# Patient Record
(demographics unavailable — no encounter records)

---

## 2024-11-26 NOTE — HISTORY OF PRESENT ILLNESS
[de-identified] : Rachel presents for her first injection of Xolair for food allergies. Rachel has a known allergy to peanut which has affected her well-being and quality of life. She currently attends the [facility]. Presently, she is feeling well other than a sore throat.

## 2024-11-26 NOTE — PHYSICAL EXAM
[Alert] : alert [Healthy Appearance] : healthy appearance [No Acute Distress] : no acute distress [Normal Pupil & Iris Size/Symmetry] : normal pupil and iris size and symmetry [No Discharge] : no discharge [Sclera Not Icteric] : sclera not icteric [Normal TMs] : both tympanic membranes were normal [Normal Nasal Mucosa] : the nasal mucosa was normal [Normal Lips/Tongue] : the lips and tongue were normal [Normal Outer Ear/Nose] : the ears and nose were normal in appearance [No Nasal Discharge] : no nasal discharge [No Thrush] : no thrush [Normal Rate and Effort] : normal respiratory rhythm and effort [No Crackles] : no crackles [No Retractions] : no retractions [Bilateral Audible Breath Sounds] : bilateral audible breath sounds [Normal Rate] : heart rate was normal  [Normal S1, S2] : normal S1 and S2 [No murmur] : no murmur [Regular Rhythm] : with a regular rhythm [Skin Intact] : skin intact  [No Rash] : no rash [No Skin Lesions] : no skin lesions [No Cyanosis] : no cyanosis [Normal Mood] : mood was normal [Normal Affect] : affect was normal [Judgment and Insight Age Appropriate] : judgement and insight is age appropriate [No LAD] : no lymphadenopathy [Supple] : the neck was supple

## 2024-11-26 NOTE — ASSESSMENT
[FreeTextEntry1] : Peanut allergy Continue strict avoidance Auvi-Q is up to date Discussed Neffy and showed Rachel and mom how to use. They would like a script sent to pharmacy to see if it is covered. Auvi-Q will be expiring soon. Xolair 300mg administered subcutaneously in office. 30 min wait No adverse events Continue Xolair 300mg Q4 weeks- Rachel will be learning how to self-administer  Pharyngitis-mild [place] gargles, lozenges, honey

## 2024-12-27 NOTE — ASSESSMENT
[FreeTextEntry1] : Peanut allergy Neffy 2.0 is up to date Continue strict avoidance Xolair administered Continue Xolair Q4 weeks

## 2024-12-27 NOTE — HISTORY OF PRESENT ILLNESS
[de-identified] : Rachel presents with mom for her 2nd Xolair injection for food allergies. Rachel has a known allergy to peanut. She has not had any accidental ingestions or cross contamination. Julio C[facility]ar is up to date. Presently, she reports that she is feeling well.

## 2024-12-27 NOTE — PHYSICAL EXAM
[Alert] : alert [Healthy Appearance] : healthy appearance [No Acute Distress] : no acute distress [Normal Pupil & Iris Size/Symmetry] : normal pupil and iris size and symmetry [No Discharge] : no discharge [Sclera Not Icteric] : sclera not icteric [Normal TMs] : both tympanic membranes were normal [Normal Nasal Mucosa] : the nasal mucosa was normal [Normal Lips/Tongue] : the lips and tongue were normal [Normal Outer Ear/Nose] : the ears and nose were normal in appearance [No Nasal Discharge] : no nasal discharge [No Thrush] : no thrush [Normal Rate and Effort] : normal respiratory rhythm and effort [No Crackles] : no crackles [No Retractions] : no retractions [Bilateral Audible Breath Sounds] : bilateral audible breath sounds [Normal Rate] : heart rate was normal  [Normal S1, S2] : normal S1 and S2 [No murmur] : no murmur [Regular Rhythm] : with a regular rhythm [Skin Intact] : skin intact  [No Rash] : no rash [No Skin Lesions] : no skin lesions [No Cyanosis] : no cyanosis [Normal Mood] : mood was normal [Normal Affect] : affect was normal [Judgment and Insight Age Appropriate] : judgement and insight is age appropriate